# Patient Record
Sex: FEMALE | Race: BLACK OR AFRICAN AMERICAN | Employment: FULL TIME | URBAN - METROPOLITAN AREA
[De-identification: names, ages, dates, MRNs, and addresses within clinical notes are randomized per-mention and may not be internally consistent; named-entity substitution may affect disease eponyms.]

---

## 2017-02-13 ENCOUNTER — OFFICE VISIT (OUTPATIENT)
Dept: CARDIOLOGY CLINIC | Age: 57
End: 2017-02-13

## 2017-02-13 DIAGNOSIS — I42.9 CARDIOMYOPATHY (HCC): ICD-10-CM

## 2017-02-13 DIAGNOSIS — Z95.810 AICD (AUTOMATIC CARDIOVERTER/DEFIBRILLATOR) PRESENT: Primary | ICD-10-CM

## 2017-05-15 ENCOUNTER — OFFICE VISIT (OUTPATIENT)
Dept: CARDIOLOGY CLINIC | Age: 57
End: 2017-05-15

## 2017-05-15 DIAGNOSIS — Z95.810 AICD (AUTOMATIC CARDIOVERTER/DEFIBRILLATOR) PRESENT: Primary | ICD-10-CM

## 2017-05-15 DIAGNOSIS — I42.9 CARDIOMYOPATHY, UNSPECIFIED TYPE (HCC): ICD-10-CM

## 2017-06-22 ENCOUNTER — TELEPHONE (OUTPATIENT)
Dept: CARDIOLOGY CLINIC | Age: 57
End: 2017-06-22

## 2017-07-25 ENCOUNTER — OFFICE VISIT (OUTPATIENT)
Dept: CARDIOLOGY CLINIC | Age: 57
End: 2017-07-25

## 2017-07-25 VITALS
OXYGEN SATURATION: 98 % | BODY MASS INDEX: 28.11 KG/M2 | HEART RATE: 72 BPM | SYSTOLIC BLOOD PRESSURE: 122 MMHG | HEIGHT: 68 IN | RESPIRATION RATE: 18 BRPM | DIASTOLIC BLOOD PRESSURE: 72 MMHG | WEIGHT: 185.5 LBS

## 2017-07-25 DIAGNOSIS — E11.9 TYPE 2 DIABETES MELLITUS WITHOUT COMPLICATION, UNSPECIFIED LONG TERM INSULIN USE STATUS: ICD-10-CM

## 2017-07-25 DIAGNOSIS — I50.22 CHRONIC SYSTOLIC HEART FAILURE (HCC): ICD-10-CM

## 2017-07-25 DIAGNOSIS — Z95.810 AICD (AUTOMATIC CARDIOVERTER/DEFIBRILLATOR) PRESENT: ICD-10-CM

## 2017-07-25 DIAGNOSIS — E78.2 MIXED HYPERLIPIDEMIA: ICD-10-CM

## 2017-07-25 DIAGNOSIS — I10 ESSENTIAL HYPERTENSION: Primary | ICD-10-CM

## 2017-07-25 RX ORDER — CANDESARTAN 32 MG/1
TABLET ORAL
COMMUNITY
Start: 2017-07-14

## 2017-07-25 RX ORDER — ATORVASTATIN CALCIUM 20 MG/1
20 TABLET, FILM COATED ORAL DAILY
Qty: 30 TAB | Refills: 6 | Status: SHIPPED | OUTPATIENT
Start: 2017-07-25

## 2017-07-25 NOTE — PROGRESS NOTES
Subjective/HPI:     Aníbal Hunt is a 61 y.o. female is here for new patient appt with hx of CHF with AICD, DM, HTN and hyperlipidemia. She was recently seen by her PCP and had Lisinopril d/c d/t cough. She was started on a new med and her BP was trending elevated still a month later, sbp over 140s. She has in general felt well. The patient denies chest pain/ shortness of breath, orthopnea, PND, LE edema, palpitations, syncope, presyncope or fatigue. PCP Provider  Darryn Muhammad NP  Past Medical History:   Diagnosis Date    AICD (automatic cardioverter/defibrillator) present     [facility] systolic [facility] failure ([facility] [address] [facility]     DM (diabetes mellitus) ([facility] [address] [facility]     HTN (hypertension)     Hyperlipidemia     Overactive bladder       No past surgical history on file. No Known Allergies   No family history on file. Current Outpatient Prescriptions   Medication Sig    candesartan (ATACAND) 32 mg tablet     OTHER     atorvastatin (LIPITOR) 20 mg tablet Take 1 Tab by mouth daily.  carvedilol (COREG) 25 mg tablet Take 25 mg by mouth two (2) times daily (with meals).  hydrALAZINE (APRESOLINE) 25 mg tablet Take 25 mg by mouth three (3) times daily.  metFORMIN ER 1,000 mg tr24 Take  by mouth daily.  furosemide (LASIX) 80 mg tablet Take  by mouth daily.  glipiZIDE SR (GLUCOTROL) 10 mg CR tablet Take 10 mg by mouth two (2) times a day.  isosorbide dinitrate (ISORDIL) 20 mg tablet Take 20 mg by mouth three (3) times daily. No current facility-administered medications for this visit. Vitals:    07/25/17 0859 07/25/17 0909   BP: 120/72 122/72   Pulse: 72    Resp: 18    SpO2: 98%    Weight: 185 lb 8 oz (84.1 kg)    Height: 5' 8\" (1.727 m)      Social History     Social History    Marital status: SINGLE     Spouse name: N/A    Number of children: N/A    Years of education: N/A     Occupational History    Not on file.      Social History Main Topics    Smoking status: Former Smoker     Packs/day: 0.50     Years: 20.00     Quit date: 7/25/2009    Smokeless tobacco: Never Used    Alcohol use 0.0 oz/week     0 Standard drinks or equivalent per week      Comment: rare    Drug use: No    Sexual activity: Not on file     Other Topics Concern    Not on file     Social History Narrative       I have reviewed the nurses notes, vitals, problem list, allergy list, medical history, family, social history and medications. Review of Symptoms:    General: Pt denies excessive weight gain or loss. Pt is able to conduct ADL's  HEENT: Denies blurred vision, headaches, epistaxis and difficulty swallowing. Respiratory: Denies shortness of breath, DANIELSON, wheezing or stridor. Cardiovascular: Denies precordial pain, palpitations, edema or PND  Gastrointestinal: Denies poor appetite, indigestion, abdominal pain or blood in stool  Urinary: Denies dysuria, pyuria  Musculoskeletal: Denies pain or swelling from muscles or joints  Neurologic: Denies tremor, paresthesias, or sensory motor disturbance  Skin: Denies rash, itching or texture change. Psych: Denies depression        Physical Exam:      General: Well developed, in no acute distress, cooperative and alert  HEENT: No carotid bruits, no JVD, trach is midline. Neck Supple, PEERL, EOM intact. Heart:  Normal S1/S2 negative S3 or S4. Regular, no murmur, gallop or rub.   Respiratory: Clear bilaterally x 4, no wheezing or rales  Abdomen:   Soft, non-tender, no masses, bowel sounds are active.   Extremities:  No edema, normal cap refill, no cyanosis, atraumatic. Neuro: A&Ox3, speech clear, gait stable. Skin: Skin color is normal. No rashes or lesions. Non diaphoretic  Vascular: 2+ pulses symmetric in all extremities    Cardiographics    ECG: Sinus  Rhythm   Voltage criteria for LVH   Old anteroseptal infarct.           Cardiology Labs:  No results found for: CHOL, CHOLX, CHLST, [address], [address], HDL, LDL, LDLC, DLDLP, TGLX, TRIGL, TRIGP, CHHD, CHHDX    No results found for: NA, K, CL, CO2, AGAP, GLU, BUN, CREA, BUCR, GFRAA, GFRNA, CA, TBIL, GPT, SGOT, AP, TP, ALB, GLOB, AGRAT, ALT        Assessment:     Assessment:     Jairo Tran was seen today for hypertension. Diagnoses and all orders for this visit:    Essential hypertension  -     AMB POC EKG ROUTINE W/ 12 LEADS, INTER & REP    Chronic systolic heart failure (HCC)    AICD (automatic cardioverter/defibrillator) present    Mixed hyperlipidemia  -     atorvastatin (LIPITOR) 20 mg tablet; Take 1 Tab by mouth daily. Type 2 diabetes mellitus without complication, unspecified long term insulin use status        ICD-10-CM ICD-9-CM    1. Essential hypertension I10 401.9 AMB POC EKG ROUTINE W/ 12 LEADS, INTER & REP   2. Chronic systolic heart failure (HCC) I50.22 428.22    3. AICD (automatic cardioverter/defibrillator) present Z95.810 V45.02    4. Mixed hyperlipidemia E78.2 272.2 atorvastatin (LIPITOR) 20 mg tablet   5. Type 2 diabetes mellitus without complication, unspecified long term insulin use status E11.9 250.00      Orders Placed This Encounter    AMB POC EKG ROUTINE W/ 12 LEADS, INTER & REP     Order Specific Question:   Reason for Exam:     Answer:   routine    candesartan (ATACAND) 32 mg tablet    OTHER    atorvastatin (LIPITOR) 20 mg tablet     Sig: Take 1 Tab by mouth daily. Dispense:  30 Tab     Refill:  6        Plan:     HTN-Patient presents today as a new patient to establish care with recent d/c of Lisinopril d/t cough and BP trending elevated in PCP office. Today she is normotensive and asymptomatic. Continue current care and f/u in 6 months. CHF with AICD- Pt is well compensated. EF in 2015 50-55%    Hyperlipidemia- LDL 89, recommend increasing Lipitor to 20mg po every day, repeat labs in 3 months. DM- managed by PCP. Hgb A1C in May 6.5    Radha Oden, SIM       Pt seen and examined in details. Agree with NP A&P. D/w pt.      Maday Owens MD

## 2017-07-25 NOTE — MR AVS SNAPSHOT
Visit Information Date & Time Provider Department Dept. Phone Encounter #  
 7/25/2017  9:00 AM Maday Owens, [address] [facility] [phone] [id number] Follow-up Instructions Return in about 6 months (around 1/25/2018). 8/14/2017  8:00 AM  
REMOTE OFFICE VISIT with [facility] [address]) Appt Note: NOT AN OFFICE VISIT - REMOTE MDT ICD  
 [address]  
[phone] [address] Upcoming Health Maintenance Date Due Hepatitis C Screening 1960 Pneumococcal 19-64 Medium Risk (1 of 1 - PPSV23) 7/7/1978 DTaP/Tdap/Td series (1 - Tdap) 7/7/1980 PAP AKA CERVICAL CYTOLOGY 7/7/1980 FOBT Q 1 YEAR AGE 50-75 7/7/2009 INFLUENZA AGE 9 TO ADULT 8/1/2017 BREAST CANCER SCRN MAMMOGRAM 10/21/2018 Allergies as of 7/25/2017  Review Complete On: 7/25/2017 By: Maday Owens MD  
 No Known Allergies Current Immunizations  Never Reviewed No immunizations on file. Not reviewed this visit You Were Diagnosed With   
  
 Codes Comments Essential hypertension    -  Primary ICD-10-CM: I10 
ICD-9-CM: 401.9 Chronic systolic heart failure (HCC)     ICD-10-CM: I50.22 ICD-9-CM: 428.22   
 AICD (automatic cardioverter/defibrillator) present     ICD-10-CM: Z95.810 ICD-9-CM: V45.02 Mixed hyperlipidemia     ICD-10-CM: E78.2 ICD-9-CM: 272.2 Type 2 diabetes mellitus without complication, unspecified long term insulin use status (HCC)     ICD-10-CM: E11.9 ICD-9-CM: 250.00 Vitals BP Pulse Resp Height(growth percentile) Weight(growth percentile) SpO2  
 122/72 (BP 1 Location: Right arm, BP Patient Position: Sitting) 72 18 5' 8\" (1.727 m) 185 lb 8 oz (84.1 kg) 98% BMI OB Status Smoking Status 28.21 kg/m2 Hysterectomy Former Smoker Vitals History BMI and BSA Data Body Mass Index Body Surface Area  
 28.21 kg/m 2 2.01 m 2 Preferred Pharmacy Pharmacy Name Phone [facility] [address] [phone] Your Updated Medication List  
  
   
This list is accurate as of: 7/25/17  9:42 AM.  Always use your most recent med list.  
  
  
  
  
 atorvastatin 20 mg tablet Commonly known as:  LIPITOR Take 1 Tab by mouth daily. candesartan 32 mg tablet Commonly known as:  ATACAND COREG 25 mg tablet Generic drug:  carvedilol Take 25 mg by mouth two (2) times daily (with meals). glipiZIDE SR 10 mg CR tablet Commonly known as:  GLUCOTROL XL Take 10 mg by mouth two (2) times a day. hydrALAZINE 25 mg tablet Commonly known as:  APRESOLINE Take 25 mg by mouth three (3) times daily. isosorbide dinitrate 20 mg tablet Commonly known as:  ISORDIL Take 20 mg by mouth three (3) times daily. LASIX 80 mg tablet Generic drug:  furosemide Take  by mouth daily. metFORMIN ER 1,000 mg Tr24 Take  by mouth daily. OTHER Prescriptions Sent to Pharmacy Refills  
 atorvastatin (LIPITOR) 20 mg tablet 6 Sig: Take 1 Tab by mouth daily. Class: Normal  
 Pharmacy: [address] Ph #: [phone] Route: Oral  
  
We Performed the Following AMB POC EKG ROUTINE W/ 12 LEADS, INTER & REP [73406 CPT(R)] Follow-up Instructions Return in about 6 months (around 1/25/2018). Introducing [facility]! Leslie Santiago introduces [employer] patient portal. Now you can access parts of your medical record, email your doctor's office, and request medication refills online. 1. In your internet browser, go to [email or web address] 2. Click on the First Time User? Click Here link in the Sign In box. You will see the New Member Sign Up page. 3. Enter your [employer] Access Code exactly as it appears below. You will not need to use this code after youve completed the sign-up process. If you do not sign up before the expiration date, you must request a new code. · [employer] Access Code: [email or web address] Expires: 8/24/2017  9:41 AM 
 
4. Enter the last four digits of your Social Security Number (xxxx) and Date of Birth (mm/dd/yyyy) as indicated and click Submit. You will be taken to the next sign-up page. 5. Create a [employer] ID. This will be your [employer] login ID and cannot be changed, so think of one that is secure and easy to remember. 6. Create a [employer] password. You can change your password at any time. 7. Enter your Password Reset Question and Answer. This can be used at a later time if you forget your password. 8. Enter your e-mail address. You will receive e-mail notification when new information is available in [address]. 9. Click Sign Up. You can now view and download portions of your medical record. 10. Click the Download Summary menu link to download a portable copy of your medical information. If you have questions, please visit the Frequently Asked Questions section of the [employer] website. Remember, [employer] is NOT to be used for urgent needs. For medical emergencies, dial 911. Now available from your iPhone and Android! Please provide this summary of care documentation to your next provider. Your primary care clinician is listed as Peter Bauer. If you have any questions after today's visit, please call [phone].

## 2017-08-21 ENCOUNTER — OFFICE VISIT (OUTPATIENT)
Dept: CARDIOLOGY CLINIC | Age: 57
End: 2017-08-21

## 2017-08-21 DIAGNOSIS — Z95.810 AICD (AUTOMATIC CARDIOVERTER/DEFIBRILLATOR) PRESENT: Primary | ICD-10-CM

## 2017-08-21 DIAGNOSIS — I50.22 CHRONIC SYSTOLIC HEART FAILURE (HCC): ICD-10-CM

## 2017-08-21 DIAGNOSIS — I42.9 CARDIOMYOPATHY, UNSPECIFIED TYPE (HCC): ICD-10-CM

## 2017-11-09 ENCOUNTER — HOSPITAL ENCOUNTER (OUTPATIENT)
Dept: MAMMOGRAPHY | Age: 57
Discharge: HOME | End: 2017-11-09
Attending: NURSE PRACTITIONER
Payer: COMMERCIAL

## 2017-11-09 DIAGNOSIS — Z12.31 VISIT FOR SCREENING MAMMOGRAM: ICD-10-CM

## 2017-11-09 PROCEDURE — 77067 SCR MAMMO BI INCL CAD: CPT

## 2017-11-20 ENCOUNTER — OFFICE VISIT (OUTPATIENT)
Dept: CARDIOLOGY CLINIC | Age: 57
End: 2017-11-20

## 2017-11-20 DIAGNOSIS — Z95.810 AICD (AUTOMATIC CARDIOVERTER/DEFIBRILLATOR) PRESENT: Primary | ICD-10-CM

## 2017-11-20 DIAGNOSIS — I42.9 CARDIOMYOPATHY, UNSPECIFIED TYPE (HCC): ICD-10-CM

## 2017-11-20 DIAGNOSIS — I50.22 CHRONIC SYSTOLIC HEART FAILURE (HCC): ICD-10-CM

## 2018-08-24 ENCOUNTER — TELEPHONE (OUTPATIENT)
Dept: CARDIOLOGY CLINIC | Age: 58
End: 2018-08-24

## 2018-08-24 NOTE — TELEPHONE ENCOUNTER
Called pt,verified pt with two pt identifiers, told pt that I had received a fax from the pharmacy requesting a refill on Lipitor. Advised pt that her last appt was on 7/25/17 and she was to f/u in 6 months but has not. She has no recent labs either. Pt advised that her PCP has been following her refill and labs. She advised that the PCP will refill the Lipitor. She verbalized understanding.